# Patient Record
Sex: MALE | Race: BLACK OR AFRICAN AMERICAN | NOT HISPANIC OR LATINO
[De-identification: names, ages, dates, MRNs, and addresses within clinical notes are randomized per-mention and may not be internally consistent; named-entity substitution may affect disease eponyms.]

---

## 2017-06-19 ENCOUNTER — APPOINTMENT (OUTPATIENT)
Dept: SPINE | Facility: CLINIC | Age: 67
End: 2017-06-19
Payer: MEDICARE

## 2017-06-19 VITALS
HEART RATE: 65 BPM | BODY MASS INDEX: 24.59 KG/M2 | SYSTOLIC BLOOD PRESSURE: 110 MMHG | HEIGHT: 64 IN | DIASTOLIC BLOOD PRESSURE: 68 MMHG | WEIGHT: 144 LBS

## 2017-06-19 DIAGNOSIS — M48.02 SPINAL STENOSIS, CERVICAL REGION: ICD-10-CM

## 2017-06-19 PROCEDURE — 99213 OFFICE O/P EST LOW 20 MIN: CPT

## 2018-07-26 ENCOUNTER — APPOINTMENT (OUTPATIENT)
Dept: OPHTHALMOLOGY | Facility: CLINIC | Age: 68
End: 2018-07-26
Payer: MEDICARE

## 2018-07-26 PROCEDURE — 92225: CPT | Mod: RT

## 2018-07-26 PROCEDURE — 92134 CPTRZ OPH DX IMG PST SGM RTA: CPT

## 2018-07-26 PROCEDURE — 92004 COMPRE OPH EXAM NEW PT 1/>: CPT

## 2019-06-17 ENCOUNTER — APPOINTMENT (OUTPATIENT)
Dept: INTERNAL MEDICINE | Facility: CLINIC | Age: 69
End: 2019-06-17

## 2019-06-26 ENCOUNTER — APPOINTMENT (OUTPATIENT)
Dept: ENDOCRINOLOGY | Facility: CLINIC | Age: 69
End: 2019-06-26
Payer: MEDICARE

## 2019-06-26 VITALS
HEART RATE: 78 BPM | HEIGHT: 64 IN | BODY MASS INDEX: 24.46 KG/M2 | TEMPERATURE: 98 F | WEIGHT: 143.3 LBS | SYSTOLIC BLOOD PRESSURE: 140 MMHG | OXYGEN SATURATION: 98 % | DIASTOLIC BLOOD PRESSURE: 60 MMHG

## 2019-06-26 DIAGNOSIS — Z78.9 OTHER SPECIFIED HEALTH STATUS: ICD-10-CM

## 2019-06-26 DIAGNOSIS — Z86.39 PERSONAL HISTORY OF OTHER ENDOCRINE, NUTRITIONAL AND METABOLIC DISEASE: ICD-10-CM

## 2019-06-26 PROCEDURE — ZZZZZ: CPT

## 2019-06-26 PROCEDURE — 99204 OFFICE O/P NEW MOD 45 MIN: CPT

## 2019-06-27 PROBLEM — Z78.9 SOCIAL ALCOHOL USE: Status: ACTIVE | Noted: 2019-06-26

## 2019-06-27 PROBLEM — Z86.39 HISTORY OF DIABETES MELLITUS: Status: RESOLVED | Noted: 2019-06-26 | Resolved: 2019-06-27

## 2019-06-27 NOTE — HISTORY OF PRESENT ILLNESS
[FreeTextEntry1] : CC: Diabetes \par This is a 69 year old male with type 2 diabetes mellitus, here for consultation. \par T2DM was diagnosed >10 years ago. He was initially started on metformin. This was then changed to Janumet. He has not taken Janumet since 11/2018 when he went to [facility]. He does not SMBG at home. Recent Hba1c was found to be 10.2%\par He denies retinopathy, neuropathy, nephropathy, however he has not had a recent dilated eye exam.

## 2019-06-27 NOTE — PHYSICAL EXAM
[Alert] : alert [No Acute Distress] : no acute distress [Well Developed] : well developed [Well Nourished] : well nourished [Healthy Appearance] : healthy appearance [Normal Voice/Communication] : normal voice communication [Normal Sclera/Conjunctiva] : normal sclera/conjunctiva [No Proptosis] : no proptosis [No Neck Mass] : no neck mass was observed [Normal Oropharynx] : the oropharynx was normal [Supple] : the neck was supple [Thyroid Not Enlarged] : the thyroid was not enlarged [No LAD] : no lymphadenopathy [No Thyroid Nodules] : there were no palpable thyroid nodules [No Respiratory Distress] : no respiratory distress [Normal Rate and Effort] : normal respiratory rhythm and effort [Clear to Auscultation] : lungs were clear to auscultation bilaterally [No Accessory Muscle Use] : no accessory muscle use [Normal Rate] : heart rate was normal  [Regular Rhythm] : with a regular rhythm [Normal S1, S2] : normal S1 and S2 [Not Tender] : non-tender [No Edema] : there was no peripheral edema [Pedal Pulses Normal] : the pedal pulses are present [Spine Straight] : spine straight [Not Distended] : not distended [Soft] : abdomen soft [No Stigmata of Cushings Syndrome] : no stigmata of cushings syndrome [Normal Gait] : normal gait [Normal Strength/Tone] : muscle strength and tone were normal [Acanthosis Nigricans] : no acanthosis nigricans [Right Foot Was Examined] : right foot ~C was examined [No Involuntary Movements] : no involuntary movements were seen [Left Foot Was Examined] : left foot ~C was examined [Diminished Throughout Both Feet] : normal tactile sensation with monofilament testing throughout both feet [No Tremors] : no tremors [Normal Sensation on Monofilament Testing] : normal sensation on monofilament testing of lower extremities [Normal Insight/Judgement] : insight and judgment were intact [Normal Affect] : the affect was normal [Normal Mood] : the mood was normal [de-identified] : onychomycosis

## 2019-06-27 NOTE — ASSESSMENT
[FreeTextEntry1] : This is a 69-year-old male with uncontrolled type 2 diabetes mellitus, hyperlipidemia,hypertension, here for consultation.\par 1. Type  diabetes mellitus\par As his hemoglobin A1c is 10.8% and he has symptoms of hyperglycemia, will initiate basal/bolus insulin.\par Appointment with CDE for further education.\par Signs, symptoms, treatment of hypoglycemia reviewed.\par Start self-monitoring blood glucose 4 times a day and send logs or office for adjustments in insulin.\par He is interested in [employer] personal CGM and will see if it is covered.\par He was referred to an ophthalmologist for a dilated eye exam to evaluate for retinopathy.\par Resume metformin 500 mg 2 times a day. Increased to 1000 mg twice a day in one week if there are no GI side effects.\par He was referred to podiatry for onychomycosis.\par Check urine microalbumin.\par 2. Hypertension\par Will likely need antihypertensive medication. Check BP with next visit this week and will consider initiating ACE inhibitor at that time.\par 3. Hyperlipidemia \par He is unsure if he has been on a medication for hyperlipidemia in the past.\par Will start statin in the next few weeks after medications for diabetes and hypertension have been started to assess for possible side effects.\par \par  [Hypoglycemia Management] : hypoglycemia management [Diabetes Foot Care] : diabetes foot care [Long Term Vascular Complications] : long term vascular complications of diabetes [Action and use of Insulin] : action and use of short and long-acting insulin [Self Monitoring of Blood Glucose] : self monitoring of blood glucose [Injection Technique, Storage, Sharps Disposal] : injection technique, storage, and sharps disposal [Insulin Self-Administration] : insulin self-administration [Retinopathy Screening] : Patient was referred to ophthalmology for retinopathy screening

## 2019-06-27 NOTE — REVIEW OF SYSTEMS
[Polyuria] : polyuria [All other systems negative] : All other systems negative [FreeTextEntry9] : shoulder pain

## 2019-06-27 NOTE — CONSULT LETTER
[Dear  ___] : Dear  [unfilled], [Please see my note below.] : Please see my note below. [Consult Letter:] : I had the pleasure of evaluating your patient, [unfilled]. [Consult Closing:] : Thank you very much for allowing me to participate in the care of this patient.  If you have any questions, please do not hesitate to contact me. [Sincerely,] : Sincerely, [FreeTextEntry3] : Neil Moran M.D., [id number].\par

## 2019-06-28 ENCOUNTER — APPOINTMENT (OUTPATIENT)
Dept: ENDOCRINOLOGY | Facility: CLINIC | Age: 69
End: 2019-06-28
Payer: COMMERCIAL

## 2019-06-28 PROCEDURE — 99215 OFFICE O/P EST HI 40 MIN: CPT

## 2019-07-01 ENCOUNTER — RX RENEWAL (OUTPATIENT)
Age: 69
End: 2019-07-01

## 2019-07-01 RX ORDER — BLOOD SUGAR DIAGNOSTIC
STRIP MISCELLANEOUS 4 TIMES DAILY
Qty: 400 | Refills: 2 | Status: ACTIVE | COMMUNITY
Start: 2019-06-28 | End: 1900-01-01

## 2019-07-01 RX ORDER — LANCETS
EACH MISCELLANEOUS
Qty: 400 | Refills: 2 | Status: ACTIVE | COMMUNITY
Start: 2019-06-28 | End: 1900-01-01

## 2019-07-17 ENCOUNTER — APPOINTMENT (OUTPATIENT)
Dept: ENDOCRINOLOGY | Facility: CLINIC | Age: 69
End: 2019-07-17
Payer: COMMERCIAL

## 2019-07-17 PROCEDURE — 99215 OFFICE O/P EST HI 40 MIN: CPT

## 2019-08-07 ENCOUNTER — APPOINTMENT (OUTPATIENT)
Dept: ENDOCRINOLOGY | Facility: CLINIC | Age: 69
End: 2019-08-07
Payer: COMMERCIAL

## 2019-08-07 PROCEDURE — 99215 OFFICE O/P EST HI 40 MIN: CPT

## 2019-08-12 ENCOUNTER — MEDICATION RENEWAL (OUTPATIENT)
Age: 69
End: 2019-08-12

## 2019-08-12 RX ORDER — ISOPROPYL ALCOHOL 0.7 ML/ML
SWAB TOPICAL
Qty: 400 | Refills: 2 | Status: ACTIVE | COMMUNITY
Start: 2019-08-12 | End: 1900-01-01

## 2019-08-28 ENCOUNTER — APPOINTMENT (OUTPATIENT)
Dept: ENDOCRINOLOGY | Facility: CLINIC | Age: 69
End: 2019-08-28
Payer: COMMERCIAL

## 2019-08-28 PROCEDURE — 99215 OFFICE O/P EST HI 40 MIN: CPT

## 2019-10-02 ENCOUNTER — APPOINTMENT (OUTPATIENT)
Dept: ENDOCRINOLOGY | Facility: CLINIC | Age: 69
End: 2019-10-02
Payer: MEDICARE

## 2019-10-02 PROCEDURE — 99215 OFFICE O/P EST HI 40 MIN: CPT

## 2019-10-10 ENCOUNTER — EMERGENCY (EMERGENCY)
Facility: HOSPITAL | Age: 69
LOS: 1 days | Discharge: HOME | End: 2019-10-10
Attending: EMERGENCY MEDICINE | Admitting: EMERGENCY MEDICINE
Payer: MEDICARE

## 2019-10-10 VITALS
TEMPERATURE: 98 F | RESPIRATION RATE: 18 BRPM | HEART RATE: 71 BPM | OXYGEN SATURATION: 100 % | DIASTOLIC BLOOD PRESSURE: 78 MMHG | SYSTOLIC BLOOD PRESSURE: 135 MMHG

## 2019-10-10 VITALS
DIASTOLIC BLOOD PRESSURE: 83 MMHG | OXYGEN SATURATION: 99 % | RESPIRATION RATE: 16 BRPM | SYSTOLIC BLOOD PRESSURE: 141 MMHG | HEART RATE: 70 BPM | TEMPERATURE: 98 F

## 2019-10-10 DIAGNOSIS — Z87.19 PERSONAL HISTORY OF OTHER DISEASES OF THE DIGESTIVE SYSTEM: Chronic | ICD-10-CM

## 2019-10-10 LAB
ALBUMIN SERPL ELPH-MCNC: 4.1 G/DL — SIGNIFICANT CHANGE UP (ref 3.3–5)
ALP SERPL-CCNC: 50 U/L — SIGNIFICANT CHANGE UP (ref 40–120)
ALT FLD-CCNC: 18 U/L — SIGNIFICANT CHANGE UP (ref 4–41)
ANION GAP SERPL CALC-SCNC: 11 MMO/L — SIGNIFICANT CHANGE UP (ref 7–14)
AST SERPL-CCNC: 33 U/L — SIGNIFICANT CHANGE UP (ref 4–40)
BASOPHILS # BLD AUTO: 0.02 K/UL — SIGNIFICANT CHANGE UP (ref 0–0.2)
BASOPHILS NFR BLD AUTO: 0.4 % — SIGNIFICANT CHANGE UP (ref 0–2)
BILIRUB SERPL-MCNC: 0.7 MG/DL — SIGNIFICANT CHANGE UP (ref 0.2–1.2)
BUN SERPL-MCNC: 12 MG/DL — SIGNIFICANT CHANGE UP (ref 7–23)
CALCIUM SERPL-MCNC: 9.5 MG/DL — SIGNIFICANT CHANGE UP (ref 8.4–10.5)
CHLORIDE SERPL-SCNC: 100 MMOL/L — SIGNIFICANT CHANGE UP (ref 98–107)
CO2 SERPL-SCNC: 27 MMOL/L — SIGNIFICANT CHANGE UP (ref 22–31)
CREAT SERPL-MCNC: 0.85 MG/DL — SIGNIFICANT CHANGE UP (ref 0.5–1.3)
EOSINOPHIL # BLD AUTO: 0.12 K/UL — SIGNIFICANT CHANGE UP (ref 0–0.5)
EOSINOPHIL NFR BLD AUTO: 2.4 % — SIGNIFICANT CHANGE UP (ref 0–6)
GLUCOSE SERPL-MCNC: 262 MG/DL — HIGH (ref 70–99)
HCT VFR BLD CALC: 42.9 % — SIGNIFICANT CHANGE UP (ref 39–50)
HGB BLD-MCNC: 14.4 G/DL — SIGNIFICANT CHANGE UP (ref 13–17)
IMM GRANULOCYTES NFR BLD AUTO: 0.2 % — SIGNIFICANT CHANGE UP (ref 0–1.5)
LIDOCAIN IGE QN: 27.3 U/L — SIGNIFICANT CHANGE UP (ref 7–60)
LYMPHOCYTES # BLD AUTO: 1.71 K/UL — SIGNIFICANT CHANGE UP (ref 1–3.3)
LYMPHOCYTES # BLD AUTO: 34.5 % — SIGNIFICANT CHANGE UP (ref 13–44)
MCHC RBC-ENTMCNC: 28.7 PG — SIGNIFICANT CHANGE UP (ref 27–34)
MCHC RBC-ENTMCNC: 33.6 % — SIGNIFICANT CHANGE UP (ref 32–36)
MCV RBC AUTO: 85.5 FL — SIGNIFICANT CHANGE UP (ref 80–100)
MONOCYTES # BLD AUTO: 0.61 K/UL — SIGNIFICANT CHANGE UP (ref 0–0.9)
MONOCYTES NFR BLD AUTO: 12.3 % — SIGNIFICANT CHANGE UP (ref 2–14)
NEUTROPHILS # BLD AUTO: 2.48 K/UL — SIGNIFICANT CHANGE UP (ref 1.8–7.4)
NEUTROPHILS NFR BLD AUTO: 50.2 % — SIGNIFICANT CHANGE UP (ref 43–77)
NRBC # FLD: 0 K/UL — SIGNIFICANT CHANGE UP (ref 0–0)
PLATELET # BLD AUTO: 152 K/UL — SIGNIFICANT CHANGE UP (ref 150–400)
PMV BLD: 9.7 FL — SIGNIFICANT CHANGE UP (ref 7–13)
POTASSIUM SERPL-MCNC: 4.3 MMOL/L — SIGNIFICANT CHANGE UP (ref 3.5–5.3)
POTASSIUM SERPL-SCNC: 4.3 MMOL/L — SIGNIFICANT CHANGE UP (ref 3.5–5.3)
PROT SERPL-MCNC: 7.4 G/DL — SIGNIFICANT CHANGE UP (ref 6–8.3)
RBC # BLD: 5.02 M/UL — SIGNIFICANT CHANGE UP (ref 4.2–5.8)
RBC # FLD: 13 % — SIGNIFICANT CHANGE UP (ref 10.3–14.5)
SODIUM SERPL-SCNC: 138 MMOL/L — SIGNIFICANT CHANGE UP (ref 135–145)
TROPONIN T, HIGH SENSITIVITY: 15 NG/L — SIGNIFICANT CHANGE UP (ref ?–14)
TROPONIN T, HIGH SENSITIVITY: 15 NG/L — SIGNIFICANT CHANGE UP (ref ?–14)
WBC # BLD: 4.95 K/UL — SIGNIFICANT CHANGE UP (ref 3.8–10.5)
WBC # FLD AUTO: 4.95 K/UL — SIGNIFICANT CHANGE UP (ref 3.8–10.5)

## 2019-10-10 PROCEDURE — 76705 ECHO EXAM OF ABDOMEN: CPT | Mod: 26

## 2019-10-10 PROCEDURE — 71046 X-RAY EXAM CHEST 2 VIEWS: CPT | Mod: 26,GW

## 2019-10-10 PROCEDURE — 99284 EMERGENCY DEPT VISIT MOD MDM: CPT

## 2019-10-10 RX ORDER — FAMOTIDINE 10 MG/ML
20 INJECTION INTRAVENOUS ONCE
Refills: 0 | Status: COMPLETED | OUTPATIENT
Start: 2019-10-10 | End: 2019-10-10

## 2019-10-10 RX ADMIN — FAMOTIDINE 20 MILLIGRAM(S): 10 INJECTION INTRAVENOUS at 14:45

## 2019-10-10 RX ADMIN — Medication 30 MILLILITER(S): at 14:45

## 2019-10-10 NOTE — ED PROVIDER NOTE - PATIENT PORTAL LINK FT
You can access the FollowMyHealth Patient Portal offered by [facility] by registering at the following website: [email or web address]. By joining [facility]’s FollowMyHealth portal, you will also be able to view your health information using other applications (apps) compatible with our system.

## 2019-10-10 NOTE — ED PROVIDER NOTE - NSFOLLOWUPINSTRUCTIONS_ED_ALL_ED_FT
Please followup with your primary care doctor within one week. You came to the hospital with chest pain. Cardiac causes of your chest pain were excluded and your pain improved with anti-acid medication.

## 2019-10-10 NOTE — ED PROVIDER NOTE - PMH
BPH (benign prostatic hyperplasia)    HLD (hyperlipidemia)    HTN (hypertension)    T2DM (type 2 diabetes mellitus)

## 2019-10-10 NOTE — ED ADULT NURSE NOTE - NSIMPLEMENTINTERV_GEN_ALL_ED
Implemented All Universal Safety Interventions:  [place] to call system. Call bell, personal items and telephone within reach. Instruct patient to call for assistance. Room bathroom lighting operational. Non-slip footwear when patient is off stretcher. Physically safe environment: no spills, clutter or unnecessary equipment. Stretcher in lowest position, wheels locked, appropriate side rails in place.

## 2019-10-10 NOTE — ED PROVIDER NOTE - OBJECTIVE STATEMENT
Pt is a 70y/o M with PMHx of HTN, HLD, poorly controlled T2DM, BPH and spinal stenosis presenting to the ED from urology office with CP.  Pt reports pain located in chest/epigastric that started last night. radiates "up." Constant, nonexertional, improves with drinking water. Also has had stabbing abd pain since yesterday that improves with lying flat. Denies cardiac history, reports prior hiatal hernia surgery. No palpitations, HA, N/V, fever/chills, diarrhea, leg swelling or SOB. +Dysuria, frequency and constipation. However urinary Sx have been present for months. Recently had ralph removed by urology.

## 2019-10-10 NOTE — ED ADULT TRIAGE NOTE - CHIEF COMPLAINT QUOTE
pt brought from Neurology office c/o CP since yesterday, non-radiating, pt states "feels like something is coming up," ASA 162mg by EMS en route, denies cardiac history,  by EMS, denies SOB, diaphoresis, N/V/D, presents w/ 18g R AC by EMS

## 2019-10-10 NOTE — ED PROVIDER NOTE - ATTENDING CONTRIBUTION TO CARE
69M pmhx T2DM and prior hiatal hernia repair p/w epigastric pain radiating up into chest. ECG non-ischemic. Likely GERD/dyspepsia. Will check CMP/Trop/Lipase, RUQ U/S and give GI cocktail.   GEN - NAD; well appearing; A+O x3   HEAD - NC/AT     EYES - EOMI, no conjunctival pallor, no scleral icterus  ENT -   mucous membranes  moist , no discharge      NECK - Neck supple  PULM - CTA b/l,  symmetric breath sounds  COR -  RRR, S1 S2, no murmurs  ABD - TTP epigastric   BACK - no CVA tenderness, nontender spine     EXTREMS - no edema, no deformity, warm and well perfused    SKIN - no rash or bruising      NEUROLOGIC - alert, sensation nl, motor 5/5 RUE/LUE/RLE/LLE

## 2019-10-10 NOTE — ED PROVIDER NOTE - PROGRESS NOTE DETAILS
Pt reassessed: reporting some improvement of pain with Maalox. Troponin stable 15->15. RUQ U/S and CXR unremarkable. Will discharge patient.

## 2019-10-10 NOTE — ED PROVIDER NOTE - CLINICAL SUMMARY MEDICAL DECISION MAKING FREE TEXT BOX
69M Hx of poorly controlled T2DM and prior hiatal hernia repair p/w epigastric pain radiating up into chest. ECG non-ischemic. Likely GERD/dyspepsia. Will check CMP/Trop/Lipase, RUQ U/S and give Pepcid/Maalox then reassess

## 2019-10-10 NOTE — ED ADULT NURSE NOTE - OBJECTIVE STATEMENT
Pt a&xo3 c/o mid-sternal chest pain, non-radiating since yesterday, states he feels like the pain is coming up, no headache/dizziness, no sob, abd soft, non-tender, non-distended, no n/v/d, pt coming from neurologist office, has 18g iv to Rt ac placed by ems, labs drawn and sent, md at bedside, will continue to monitor.

## 2019-10-23 ENCOUNTER — MEDICATION RENEWAL (OUTPATIENT)
Age: 69
End: 2019-10-23

## 2019-10-23 ENCOUNTER — APPOINTMENT (OUTPATIENT)
Dept: ENDOCRINOLOGY | Facility: CLINIC | Age: 69
End: 2019-10-23
Payer: MEDICARE

## 2019-10-23 PROBLEM — E11.9 TYPE 2 DIABETES MELLITUS WITHOUT COMPLICATIONS: Chronic | Status: ACTIVE | Noted: 2019-10-10

## 2019-10-23 PROBLEM — E78.5 HYPERLIPIDEMIA, UNSPECIFIED: Chronic | Status: ACTIVE | Noted: 2019-10-10

## 2019-10-23 PROBLEM — N40.0 BENIGN PROSTATIC HYPERPLASIA WITHOUT LOWER URINARY TRACT SYMPTOMS: Chronic | Status: ACTIVE | Noted: 2019-10-10

## 2019-10-23 PROBLEM — I10 ESSENTIAL (PRIMARY) HYPERTENSION: Chronic | Status: ACTIVE | Noted: 2019-10-10

## 2019-10-23 PROCEDURE — 99215 OFFICE O/P EST HI 40 MIN: CPT

## 2019-10-23 RX ORDER — INSULIN LISPRO 100 [IU]/ML
100 INJECTION, SOLUTION INTRAVENOUS; SUBCUTANEOUS
Qty: 5 | Refills: 1 | Status: COMPLETED | COMMUNITY
Start: 2019-06-28 | End: 2019-10-23

## 2019-11-04 ENCOUNTER — APPOINTMENT (OUTPATIENT)
Dept: ENDOCRINOLOGY | Facility: CLINIC | Age: 69
End: 2019-11-04

## 2019-11-06 ENCOUNTER — APPOINTMENT (OUTPATIENT)
Dept: ENDOCRINOLOGY | Facility: CLINIC | Age: 69
End: 2019-11-06

## 2019-11-20 ENCOUNTER — RX RENEWAL (OUTPATIENT)
Age: 69
End: 2019-11-20

## 2020-07-30 ENCOUNTER — APPOINTMENT (OUTPATIENT)
Dept: ENDOCRINOLOGY | Facility: CLINIC | Age: 70
End: 2020-07-30
Payer: MEDICARE

## 2020-07-30 VITALS
OXYGEN SATURATION: 98 % | HEART RATE: 88 BPM | DIASTOLIC BLOOD PRESSURE: 83 MMHG | SYSTOLIC BLOOD PRESSURE: 148 MMHG | TEMPERATURE: 98.2 F

## 2020-07-30 PROCEDURE — 99214 OFFICE O/P EST MOD 30 MIN: CPT | Mod: 25

## 2020-07-30 PROCEDURE — 36415 COLL VENOUS BLD VENIPUNCTURE: CPT

## 2020-07-30 RX ORDER — BLOOD-GLUCOSE METER
KIT MISCELLANEOUS
Qty: 1 | Refills: 0 | Status: ACTIVE | COMMUNITY
Start: 2020-07-30 | End: 1900-01-01

## 2020-07-30 NOTE — PHYSICAL EXAM
[Alert] : alert [Well Nourished] : well nourished [Healthy Appearance] : healthy appearance [No Acute Distress] : no acute distress [Well Developed] : well developed [Normal Voice/Communication] : normal voice communication [Normal Sclera/Conjunctiva] : normal sclera/conjunctiva [No Proptosis] : no proptosis [No Neck Mass] : no neck mass was observed [No LAD] : no lymphadenopathy [Supple] : the neck was supple [Thyroid Not Enlarged] : the thyroid was not enlarged [No Thyroid Nodules] : no palpable thyroid nodules [No Respiratory Distress] : no respiratory distress [No Accessory Muscle Use] : no accessory muscle use [Normal Rate and Effort] : normal respiratory rate and effort [Normal Rate] : heart rate was normal [No Edema] : no peripheral edema [Pedal Pulses Normal] : the pedal pulses are present [Not Distended] : not distended [No Stigmata of Cushings Syndrome] : no stigmata of Cushings Syndrome [Spine Straight] : spine straight [Normal Gait] : normal gait [No Clubbing, Cyanosis] : no clubbing  or cyanosis of the fingernails [Acanthosis Nigricans] : no acanthosis nigricans [No Involuntary Movements] : no involuntary movements were seen [Right Foot Was Examined] : right foot ~C was examined [Left Foot Was Examined] : left foot ~C was examined [Normal] : normal [2+] : 2+ in the dorsalis pedis [Diminished Throughout Both Feet] : normal tactile sensation with monofilament testing throughout both feet [No Tremors] : no tremors [Normal Affect] : the affect was normal [Normal Sensation on Monofilament Testing] : normal sensation on monofilament testing of lower extremities [Normal Insight/Judgement] : insight and judgment were intact [Normal Mood] : the mood was normal

## 2020-07-30 NOTE — HISTORY OF PRESENT ILLNESS
[FreeTextEntry1] : CC: Diabetes \par This is a 70 year old male with type 2 diabetes mellitus, here for consultation. \par T2DM was diagnosed >10 years ago. He was initially started on metformin. This was then changed to Janumet. He is now on Novolog 4 units with breakfast. He is not taking Novolog at lunch or dinner. He takes Tresiba 8-10 units qhs. He SMBG 1 x day. F 130-180s. He denies hypoglycemia. \par He has not seen an ophthalmologist in over 5 years. He denies retinopathy, neuropathy, and nephropathy.\par He is not on a statin.

## 2020-07-30 NOTE — ASSESSMENT
[FreeTextEntry1] : This is a 8-year-old male with uncontrolled type 2 diabetes mellitus, hyperlipidemia,hypertension, here for consultation.\par 1. Type  diabetes mellitus\par Check hemoglobin A1c.\par He was advised to take NovoLog 4 units with each meal.\par He was advised to start self monitoring blood glucose 4 times a day. \par Appointment with CDE. Start Novolog correction scale qac. \par He was referred to an ophthalmologist for a dilated eye exam to evaluate for retinopathy as he has not had a dilated eye exam in 5 years. \par Check urine microalbumin to evaluate for nephropathy. \par He denies neuropathy. \par He is not on a statin. Check lipid panel. \par 2. Hypertension\par Will likely need antihypertensive medication. Check BP at home and call with readings. Will consider initiating ACE inhibitor.\par 3. Hyperlipidemia \par Check lipid panel. \par Will likely needs statin after bloodwork is reviewed. \par \par

## 2020-07-31 LAB
ALBUMIN SERPL ELPH-MCNC: 4.4 G/DL
ALP BLD-CCNC: 63 U/L
ALT SERPL-CCNC: 16 U/L
ANION GAP SERPL CALC-SCNC: 11 MMOL/L
AST SERPL-CCNC: 22 U/L
BASOPHILS # BLD AUTO: 0.02 K/UL
BASOPHILS NFR BLD AUTO: 0.4 %
BILIRUB SERPL-MCNC: 0.6 MG/DL
BUN SERPL-MCNC: 14 MG/DL
CALCIUM SERPL-MCNC: 9.8 MG/DL
CHLORIDE SERPL-SCNC: 103 MMOL/L
CHOLEST SERPL-MCNC: 181 MG/DL
CHOLEST/HDLC SERPL: 3 RATIO
CO2 SERPL-SCNC: 29 MMOL/L
CREAT SERPL-MCNC: 0.74 MG/DL
EOSINOPHIL # BLD AUTO: 0.08 K/UL
EOSINOPHIL NFR BLD AUTO: 1.5 %
ESTIMATED AVERAGE GLUCOSE: 163 MG/DL
GLUCOSE SERPL-MCNC: 144 MG/DL
HBA1C MFR BLD HPLC: 7.3 %
HCT VFR BLD CALC: 42.4 %
HDLC SERPL-MCNC: 59 MG/DL
HGB BLD-MCNC: 13.2 G/DL
IMM GRANULOCYTES NFR BLD AUTO: 0.2 %
LDLC SERPL CALC-MCNC: 99 MG/DL
LYMPHOCYTES # BLD AUTO: 1.22 K/UL
LYMPHOCYTES NFR BLD AUTO: 22.3 %
MAN DIFF?: NORMAL
MCHC RBC-ENTMCNC: 28.6 PG
MCHC RBC-ENTMCNC: 31.1 GM/DL
MCV RBC AUTO: 92 FL
MONOCYTES # BLD AUTO: 0.5 K/UL
MONOCYTES NFR BLD AUTO: 9.1 %
NEUTROPHILS # BLD AUTO: 3.65 K/UL
NEUTROPHILS NFR BLD AUTO: 66.5 %
PLATELET # BLD AUTO: 189 K/UL
POTASSIUM SERPL-SCNC: 4.2 MMOL/L
PROT SERPL-MCNC: 7.3 G/DL
RBC # BLD: 4.61 M/UL
RBC # FLD: 15 %
SODIUM SERPL-SCNC: 143 MMOL/L
TRIGL SERPL-MCNC: 111 MG/DL
WBC # FLD AUTO: 5.48 K/UL

## 2020-08-06 ENCOUNTER — APPOINTMENT (OUTPATIENT)
Dept: ENDOCRINOLOGY | Facility: CLINIC | Age: 70
End: 2020-08-06
Payer: MEDICARE

## 2020-08-06 PROCEDURE — G0108 DIAB MANAGE TRN  PER INDIV: CPT

## 2020-09-17 ENCOUNTER — APPOINTMENT (OUTPATIENT)
Dept: ENDOCRINOLOGY | Facility: CLINIC | Age: 70
End: 2020-09-17

## 2020-11-11 NOTE — ED PROVIDER NOTE - CPE EDP RESP NORM
[facility] Call Center    Phone Message    May a detailed message be left on voicemail: no     Reason for Call: Other: Evelin would like a call back to report an urgent finding     Action Taken: Message routed to:  [facility] ([facility]): Cardio    Travel Screening: Not Applicable                                                                       normal...

## 2020-12-10 ENCOUNTER — APPOINTMENT (OUTPATIENT)
Dept: ENDOCRINOLOGY | Facility: CLINIC | Age: 70
End: 2020-12-10
Payer: MEDICARE

## 2020-12-10 VITALS
HEART RATE: 81 BPM | DIASTOLIC BLOOD PRESSURE: 96 MMHG | HEIGHT: 62.2 IN | OXYGEN SATURATION: 99 % | SYSTOLIC BLOOD PRESSURE: 169 MMHG | TEMPERATURE: 97.1 F | WEIGHT: 150.21 LBS | BODY MASS INDEX: 27.29 KG/M2

## 2020-12-10 DIAGNOSIS — E16.2 HYPOGLYCEMIA, UNSPECIFIED: ICD-10-CM

## 2020-12-10 PROCEDURE — 36415 COLL VENOUS BLD VENIPUNCTURE: CPT

## 2020-12-10 PROCEDURE — 99072 ADDL SUPL MATRL&STAF TM PHE: CPT

## 2020-12-10 PROCEDURE — G0108 DIAB MANAGE TRN  PER INDIV: CPT

## 2020-12-10 PROCEDURE — 99214 OFFICE O/P EST MOD 30 MIN: CPT | Mod: 25

## 2020-12-10 RX ORDER — RAMIPRIL 2.5 MG/1
2.5 CAPSULE ORAL DAILY
Qty: 90 | Refills: 2 | Status: COMPLETED | COMMUNITY
Start: 2020-12-10 | End: 2021-09-06

## 2020-12-11 LAB
ALBUMIN SERPL ELPH-MCNC: 4.7 G/DL
ALP BLD-CCNC: 66 U/L
ALT SERPL-CCNC: 25 U/L
ANION GAP SERPL CALC-SCNC: 14 MMOL/L
AST SERPL-CCNC: 32 U/L
BASOPHILS # BLD AUTO: 0.03 K/UL
BASOPHILS NFR BLD AUTO: 0.4 %
BILIRUB SERPL-MCNC: 0.6 MG/DL
BUN SERPL-MCNC: 14 MG/DL
CALCIUM SERPL-MCNC: 9.7 MG/DL
CHLORIDE SERPL-SCNC: 104 MMOL/L
CHOLEST SERPL-MCNC: 183 MG/DL
CO2 SERPL-SCNC: 27 MMOL/L
CREAT SERPL-MCNC: 1.02 MG/DL
CREAT SPEC-SCNC: 69 MG/DL
EOSINOPHIL # BLD AUTO: 0.16 K/UL
EOSINOPHIL NFR BLD AUTO: 2 %
ESTIMATED AVERAGE GLUCOSE: 151 MG/DL
GLUCOSE BLDC GLUCOMTR-MCNC: 121
GLUCOSE BLDC GLUCOMTR-MCNC: 70
GLUCOSE SERPL-MCNC: 70 MG/DL
HBA1C MFR BLD HPLC: 6.9 %
HCT VFR BLD CALC: 46.6 %
HDLC SERPL-MCNC: 67 MG/DL
HGB BLD-MCNC: 15.1 G/DL
IMM GRANULOCYTES NFR BLD AUTO: 0.1 %
LDLC SERPL CALC-MCNC: 105 MG/DL
LYMPHOCYTES # BLD AUTO: 4 K/UL
LYMPHOCYTES NFR BLD AUTO: 50.9 %
MAN DIFF?: NORMAL
MCHC RBC-ENTMCNC: 28.4 PG
MCHC RBC-ENTMCNC: 32.4 GM/DL
MCV RBC AUTO: 87.6 FL
MICROALBUMIN 24H UR DL<=1MG/L-MCNC: 5.1 MG/DL
MICROALBUMIN/CREAT 24H UR-RTO: 74 MG/G
MONOCYTES # BLD AUTO: 0.69 K/UL
MONOCYTES NFR BLD AUTO: 8.8 %
NEUTROPHILS # BLD AUTO: 2.97 K/UL
NEUTROPHILS NFR BLD AUTO: 37.8 %
NONHDLC SERPL-MCNC: 117 MG/DL
PLATELET # BLD AUTO: 189 K/UL
POTASSIUM SERPL-SCNC: 3.7 MMOL/L
PROT SERPL-MCNC: 7.5 G/DL
RBC # BLD: 5.32 M/UL
RBC # FLD: 14.1 %
SODIUM SERPL-SCNC: 145 MMOL/L
TRIGL SERPL-MCNC: 59 MG/DL
WBC # FLD AUTO: 7.86 K/UL

## 2020-12-14 PROBLEM — E16.2 HYPOGLYCEMIA: Status: ACTIVE | Noted: 2020-12-14

## 2020-12-14 NOTE — PHYSICAL EXAM
[Alert] : alert [Well Nourished] : well nourished [Healthy Appearance] : healthy appearance [No Acute Distress] : no acute distress [Well Developed] : well developed [Normal Voice/Communication] : normal voice communication [Normal Sclera/Conjunctiva] : normal sclera/conjunctiva [No Proptosis] : no proptosis [No Neck Mass] : no neck mass was observed [No LAD] : no lymphadenopathy [Supple] : the neck was supple [Thyroid Not Enlarged] : the thyroid was not enlarged [No Thyroid Nodules] : no palpable thyroid nodules [No Respiratory Distress] : no respiratory distress [No Accessory Muscle Use] : no accessory muscle use [Normal Rate and Effort] : normal respiratory rate and effort [Clear to Auscultation] : lungs were clear to auscultation bilaterally [Normal S1, S2] : normal S1 and S2 [Normal Rate] : heart rate was normal [Regular Rhythm] : with a regular rhythm [No Edema] : no peripheral edema [Pedal Pulses Normal] : the pedal pulses are present [Not Distended] : not distended [Spine Straight] : spine straight [No Stigmata of Cushings Syndrome] : no stigmata of Cushings Syndrome [Normal Gait] : normal gait [No Clubbing, Cyanosis] : no clubbing  or cyanosis of the fingernails [No Involuntary Movements] : no involuntary movements were seen [Acanthosis Nigricans] : no acanthosis nigricans [Right Foot Was Examined] : right foot ~C was examined [Left Foot Was Examined] : left foot ~C was examined [Normal] : normal [2+] : 2+ in the dorsalis pedis [Diminished Throughout Both Feet] : normal tactile sensation with monofilament testing throughout both feet [No Tremors] : no tremors [Normal Sensation on Monofilament Testing] : normal sensation on monofilament testing of lower extremities [Normal Affect] : the affect was normal [Normal Insight/Judgement] : insight and judgment were intact [Normal Mood] : the mood was normal

## 2020-12-14 NOTE — ASSESSMENT
[FreeTextEntry1] : This is a 70-year-old male with uncontrolled type 2 diabetes mellitus, hyperlipidemia,hypertension, here for follow up.\par 1. Type  diabetes mellitus\par Check hemoglobin A1c.\par He was advised to start self monitoring blood glucose 4 times a day. \par Signs, symptoms, treatment of hypoglycemia were reviewed. He was provided with juice today and repeat FS glucose is in normal range.\par Appointment with CDE.\par He was referred to an ophthalmologist for a dilated eye exam to evaluate for retinopathy as he has not had a dilated eye exam in over 5 years. \par Check urine microalbumin to evaluate for nephropathy. \par He denies neuropathy. \par He is not on a statin. Check lipid panel. \par 2. Hypertension\par Start ramipril 2.5mg qd. Potential side effects were reviewed.\par 3. Hyperlipidemia \par Check lipid panel. \par Will likely needs statin after bloodwork is reviewed. \par \par

## 2020-12-14 NOTE — HISTORY OF PRESENT ILLNESS
[FreeTextEntry1] : CC: Diabetes \par This is a 70 year old male with insulin requiring type 2 diabetes mellitus, HTN, HLD, here for follow up. \par T2DM was diagnosed >10 years ago. He was initially started on metformin. This was then changed to Janumet. He is now on Novolog 4-5 units with breakfast and dinner. He often skips lunch. He takes Basaglar 10 units qhs. He SMBG 2 x day. F 150-200s, L 160s. He denies hypoglycemia but had an episode of hypoglycemia in office today due to skipping meal. \par He has not seen an ophthalmologist in over 5 years. He denies retinopathy, neuropathy, and nephropathy.\par He is not on a statin.

## 2020-12-21 RX ORDER — BLOOD SUGAR DIAGNOSTIC
STRIP MISCELLANEOUS
Qty: 4 | Refills: 3 | Status: COMPLETED | COMMUNITY
Start: 2019-07-01 | End: 2021-12-16

## 2021-01-04 ENCOUNTER — NON-APPOINTMENT (OUTPATIENT)
Age: 71
End: 2021-01-04

## 2021-01-04 ENCOUNTER — APPOINTMENT (OUTPATIENT)
Dept: OPHTHALMOLOGY | Facility: CLINIC | Age: 71
End: 2021-01-04
Payer: MEDICARE

## 2021-01-04 PROCEDURE — 92014 COMPRE OPH EXAM EST PT 1/>: CPT

## 2021-01-04 PROCEDURE — 99072 ADDL SUPL MATRL&STAF TM PHE: CPT

## 2021-01-04 PROCEDURE — 92134 CPTRZ OPH DX IMG PST SGM RTA: CPT

## 2021-01-11 ENCOUNTER — APPOINTMENT (OUTPATIENT)
Dept: ENDOCRINOLOGY | Facility: CLINIC | Age: 71
End: 2021-01-11
Payer: MEDICARE

## 2021-01-11 PROCEDURE — 99072 ADDL SUPL MATRL&STAF TM PHE: CPT

## 2021-01-11 PROCEDURE — G0108 DIAB MANAGE TRN  PER INDIV: CPT

## 2021-02-22 ENCOUNTER — APPOINTMENT (OUTPATIENT)
Dept: ENDOCRINOLOGY | Facility: CLINIC | Age: 71
End: 2021-02-22
Payer: MEDICARE

## 2021-02-22 PROCEDURE — G0108 DIAB MANAGE TRN  PER INDIV: CPT

## 2021-02-22 PROCEDURE — 99072 ADDL SUPL MATRL&STAF TM PHE: CPT

## 2021-03-15 ENCOUNTER — APPOINTMENT (OUTPATIENT)
Dept: ENDOCRINOLOGY | Facility: CLINIC | Age: 71
End: 2021-03-15
Payer: MEDICARE

## 2021-03-15 VITALS
DIASTOLIC BLOOD PRESSURE: 77 MMHG | HEART RATE: 74 BPM | OXYGEN SATURATION: 98 % | BODY MASS INDEX: 27.2 KG/M2 | WEIGHT: 149.67 LBS | TEMPERATURE: 97.7 F | SYSTOLIC BLOOD PRESSURE: 131 MMHG | HEIGHT: 62.2 IN

## 2021-03-15 LAB — GLUCOSE BLDC GLUCOMTR-MCNC: 108

## 2021-03-15 PROCEDURE — 99214 OFFICE O/P EST MOD 30 MIN: CPT | Mod: 25

## 2021-03-15 PROCEDURE — 36415 COLL VENOUS BLD VENIPUNCTURE: CPT

## 2021-03-15 PROCEDURE — 99072 ADDL SUPL MATRL&STAF TM PHE: CPT

## 2021-03-16 LAB
ALBUMIN SERPL ELPH-MCNC: 4.5 G/DL
ALP BLD-CCNC: 54 U/L
ALT SERPL-CCNC: 20 U/L
ANION GAP SERPL CALC-SCNC: 11 MMOL/L
AST SERPL-CCNC: 35 U/L
BASOPHILS # BLD AUTO: 0.02 K/UL
BASOPHILS NFR BLD AUTO: 0.4 %
BILIRUB SERPL-MCNC: 0.7 MG/DL
BUN SERPL-MCNC: 19 MG/DL
CALCIUM SERPL-MCNC: 9.8 MG/DL
CHLORIDE SERPL-SCNC: 102 MMOL/L
CHOLEST SERPL-MCNC: 162 MG/DL
CO2 SERPL-SCNC: 28 MMOL/L
CREAT SERPL-MCNC: 0.98 MG/DL
EOSINOPHIL # BLD AUTO: 0.11 K/UL
EOSINOPHIL NFR BLD AUTO: 2.4 %
ESTIMATED AVERAGE GLUCOSE: 134 MG/DL
GLUCOSE SERPL-MCNC: 76 MG/DL
HBA1C MFR BLD HPLC: 6.3 %
HCT VFR BLD CALC: 46 %
HDLC SERPL-MCNC: 56 MG/DL
HGB BLD-MCNC: 14.5 G/DL
IMM GRANULOCYTES NFR BLD AUTO: 0 %
LDLC SERPL CALC-MCNC: 81 MG/DL
LYMPHOCYTES # BLD AUTO: 1.8 K/UL
LYMPHOCYTES NFR BLD AUTO: 38.9 %
MAN DIFF?: NORMAL
MCHC RBC-ENTMCNC: 28.6 PG
MCHC RBC-ENTMCNC: 31.5 GM/DL
MCV RBC AUTO: 90.7 FL
MONOCYTES # BLD AUTO: 0.45 K/UL
MONOCYTES NFR BLD AUTO: 9.7 %
NEUTROPHILS # BLD AUTO: 2.25 K/UL
NEUTROPHILS NFR BLD AUTO: 48.6 %
NONHDLC SERPL-MCNC: 107 MG/DL
PLATELET # BLD AUTO: 182 K/UL
POTASSIUM SERPL-SCNC: 4.2 MMOL/L
PROT SERPL-MCNC: 7.2 G/DL
RBC # BLD: 5.07 M/UL
RBC # FLD: 14.2 %
SODIUM SERPL-SCNC: 141 MMOL/L
TRIGL SERPL-MCNC: 127 MG/DL
WBC # FLD AUTO: 4.63 K/UL

## 2021-03-19 NOTE — HISTORY OF PRESENT ILLNESS
[FreeTextEntry1] : CC: Diabetes \par This is a 71 year old male with insulin requiring type 2 diabetes mellitus, HTN, HLD, here for follow up. \par T2DM was diagnosed >10 years ago. He was initially started on metformin. This was then changed to Janumet. He is now on Novolog before meals and Basaglar 8 units at bedtime.\par He self monitors blood glucose 6 times per day.  Fasting 70-120s, before meals low 100s, after meals 130-160s.  He reports rare hypoglycemia.\par He saw his ophthalmologist in December 2020 and denies retinopathy.\par He denies neuropathy.  He denies nephropathy.\par He is on pravastatin 10 mg daily.

## 2021-03-19 NOTE — ASSESSMENT
[FreeTextEntry1] : This is a 71-year-old male with uncontrolled type 2 diabetes mellitus, hyperlipidemia,hypertension, here for follow up.\par 1. Type  diabetes mellitus\par Check hemoglobin A1c.\par Decrease Basaglar from 8 units to 7 units to prevent fasting hypoglycemia.\par Signs, symptoms, treatment of hypoglycemia were reviewed.\par He self monitors blood glucose 6 times a day and takes for insulin injections daily. \par He is interested in CGM.  Will see if it is covered.\par Appointment with CDE.\par He saw his ophthalmologist in December 2020 and denies retinopathy.\par Check urine microalbumin to evaluate for nephropathy. \par He denies neuropathy. \par He is on pravastatin 10 mg daily.\par Will consider discontinuing prandial insulin and starting oral medications pending results.\par 2. Hypertension\par Well-controlled.\par 3. Hyperlipidemia \par Check lipid panel. \par Continue pravastatin 10 mg daily pending results.\par \par

## 2021-03-24 RX ORDER — BLOOD-GLUCOSE METER
KIT MISCELLANEOUS
Qty: 1 | Refills: 0 | Status: ACTIVE | COMMUNITY
Start: 2019-07-01

## 2021-03-24 RX ORDER — BLOOD-GLUCOSE TRANSMITTER
EACH MISCELLANEOUS
Qty: 1 | Refills: 3 | Status: ACTIVE | COMMUNITY
Start: 2021-03-23 | End: 1900-01-01

## 2021-03-24 RX ORDER — BLOOD-GLUCOSE SENSOR
EACH MISCELLANEOUS
Qty: 3 | Refills: 3 | Status: ACTIVE | COMMUNITY
Start: 2021-03-23 | End: 1900-01-01

## 2021-03-24 RX ORDER — BLOOD-GLUCOSE,RECEIVER,CONT
EACH MISCELLANEOUS
Qty: 1 | Refills: 0 | Status: ACTIVE | COMMUNITY
Start: 2021-03-23 | End: 1900-01-01

## 2021-03-29 ENCOUNTER — APPOINTMENT (OUTPATIENT)
Dept: ENDOCRINOLOGY | Facility: CLINIC | Age: 71
End: 2021-03-29
Payer: MEDICARE

## 2021-03-29 PROCEDURE — 99072 ADDL SUPL MATRL&STAF TM PHE: CPT

## 2021-03-29 PROCEDURE — G0108 DIAB MANAGE TRN  PER INDIV: CPT

## 2021-03-29 RX ORDER — INSULIN GLARGINE 100 [IU]/ML
100 INJECTION, SOLUTION SUBCUTANEOUS
Qty: 3 | Refills: 2 | Status: ACTIVE | COMMUNITY
Start: 2019-06-28 | End: 1900-01-01

## 2021-07-14 ENCOUNTER — TRANSCRIPTION ENCOUNTER (OUTPATIENT)
Age: 71
End: 2021-07-14

## 2021-07-15 ENCOUNTER — RESULT REVIEW (OUTPATIENT)
Age: 71
End: 2021-07-15

## 2021-07-15 ENCOUNTER — OUTPATIENT (OUTPATIENT)
Dept: OUTPATIENT SERVICES | Facility: HOSPITAL | Age: 71
LOS: 1 days | Discharge: HOME | End: 2021-07-15
Payer: MEDICARE

## 2021-07-15 VITALS
DIASTOLIC BLOOD PRESSURE: 73 MMHG | HEIGHT: 63 IN | RESPIRATION RATE: 17 BRPM | HEART RATE: 55 BPM | SYSTOLIC BLOOD PRESSURE: 133 MMHG | WEIGHT: 145.95 LBS | OXYGEN SATURATION: 98 % | TEMPERATURE: 98 F

## 2021-07-15 VITALS
RESPIRATION RATE: 16 BRPM | TEMPERATURE: 97 F | SYSTOLIC BLOOD PRESSURE: 137 MMHG | OXYGEN SATURATION: 98 % | HEART RATE: 59 BPM | DIASTOLIC BLOOD PRESSURE: 79 MMHG

## 2021-07-15 DIAGNOSIS — Z12.11 ENCOUNTER FOR SCREENING FOR MALIGNANT NEOPLASM OF COLON: ICD-10-CM

## 2021-07-15 DIAGNOSIS — Z87.19 PERSONAL HISTORY OF OTHER DISEASES OF THE DIGESTIVE SYSTEM: Chronic | ICD-10-CM

## 2021-07-15 LAB — GLUCOSE BLDC GLUCOMTR-MCNC: 117 MG/DL — HIGH (ref 70–99)

## 2021-07-15 PROCEDURE — 88305 TISSUE EXAM BY PATHOLOGIST: CPT | Mod: 26

## 2021-07-15 RX ORDER — SODIUM CHLORIDE 9 MG/ML
1000 INJECTION, SOLUTION INTRAVENOUS
Refills: 0 | Status: DISCONTINUED | OUTPATIENT
Start: 2021-07-15 | End: 2021-07-15

## 2021-07-15 RX ORDER — ONDANSETRON 8 MG/1
4 TABLET, FILM COATED ORAL ONCE
Refills: 0 | Status: DISCONTINUED | OUTPATIENT
Start: 2021-07-15 | End: 2021-07-15

## 2021-07-15 RX ORDER — SITAGLIPTIN AND METFORMIN HYDROCHLORIDE 500; 50 MG/1; MG/1
1 TABLET, FILM COATED ORAL
Qty: 0 | Refills: 0 | DISCHARGE

## 2021-07-15 RX ADMIN — SODIUM CHLORIDE 50 MILLILITER(S): 9 INJECTION, SOLUTION INTRAVENOUS at 11:19

## 2021-07-15 NOTE — ASU DISCHARGE PLAN (ADULT/PEDIATRIC) - OK TO LEAVE MESSAGE ON VOICEMAIL
Subjective:      Patient ID: Abner Chan is a 82 y.o. female. HPI  Chief Complaint   Patient presents with    1 Month Follow-Up    Other     patient currently wearing heart monitor          Here for recheck after hospitalization for GI bleed over a month ago. She had an EGD showing ulcerations. She is doing much better now and feeling back to baseline. However, she is having more constipation. Hypothyroidism: Patient presents for evaluation of thyroid function. Symptoms consist of fatigue, feeling cold and cold intolerance. Symptoms have present for several years. The symptoms are mild. The problem has been controlled. Previous thyroid studies include TSH, free thyroxine index and triiodothyronine total. The hypothyroidism is due to hypothyroidism. Lab Results   Component Value Date    TSH 5.530 (H) 08/16/2017    [id number] 54 (L) 02/11/2014       Review of Systems  Constitutional: Negative for fever, chills, diaphoresis, activity change, appetite change and fatigue. HENT: Negative for hearing loss, ear pain, congestion, sore throat, rhinorrhea, postnasal drip and ear discharge. Eyes: Negative for photophobia and visual disturbance. Respiratory: Negative for cough, chest tightness, shortness of breath and wheezing. Cardiovascular: Negative for chest pain and leg swelling. Gastrointestinal: Negative for nausea, vomiting, abdominal pain, diarrhea and constipation. Genitourinary: Negative for dysuria, urgency and frequency. Neurological: Negative for weakness, light-headedness and headaches. Psychiatric/Behavioral: Negative for sleep disturbance. Objective:   Physical Exam  Vitals:    02/15/18 1430   BP: 130/80   Pulse: 72   SpO2: (!) 18%     Wt Readings from Last 3 Encounters:   02/15/18 148 lb (67.1 kg)   02/07/18 147 lb (66.7 kg)   01/24/18 146 lb (66.2 kg)     Physical Exam   Constitutional: Vital signs are normal. She appears well-developed and well-nourished. She is active. HENT:   Head: Normocephalic and atraumatic. Right Ear: Tympanic membrane, external ear and ear canal normal. No drainage or tenderness. Left Ear: Tympanic membrane, external ear and ear canal normal. No drainage or tenderness. Nose: Nose normal. No mucosal edema or rhinorrhea. Mouth/Throat: Uvula is midline, oropharynx is clear and moist and mucous membranes are normal. Mucous membranes are not pale. Normal dentition. No posterior oropharyngeal edema or posterior oropharyngeal erythema. Eyes: Lids are normal. Right eye exhibits no chemosis and no discharge. Left eye exhibits no chemosis and no drainage. Right conjunctiva has no hemorrhage. Left conjunctiva has no hemorrhage. Right eye exhibits normal extraocular motion. Left eye exhibits normal extraocular motion. Right pupil is round and reactive. Left pupil is round and reactive. Pupils are equal.   Cardiovascular: Normal rate, regular rhythm, S1 normal, S2 normal and normal heart sounds. Exam reveals no gallop. No murmur heard. Pulmonary/Chest: Effort normal and breath sounds normal. No respiratory distress. She has no wheezes. She has no rhonchi. She has no rales. Abdominal: Soft. Normal appearance and bowel sounds are normal. She exhibits no distension and no mass. There is no hepatosplenomegaly. No tenderness. She has no rigidity, no rebound and no guarding. No hernia. Musculoskeletal:        Right lower leg: She exhibits no edema. Left lower leg: She exhibits no edema. Neurological: She is alert. Assessment:      Laly Malhotra was seen today for 1 month follow-up and other. Diagnoses and all orders for this visit:    Gastrointestinal hemorrhage with hematemesis  -     senna (SENOKOT) 8.6 MG tablet; Take 1 tablet by mouth 2 times daily    Other iron deficiency anemia  -     ferrous sulfate (FE TABS) 325 (65 Fe) MG EC tablet; Take 1 tablet by mouth 2 times daily  -     Iron; Future  -     CBC;  Future    Renal dysfunction  - Yes

## 2021-07-15 NOTE — ASU DISCHARGE PLAN (ADULT/PEDIATRIC) - ASU DC SPECIAL INSTRUCTIONSFT
Resume all previous medications    Please call Dr. Roberts's office at  to been seen in a follow up office visit two weeks after the completion of your procedure.

## 2021-07-16 LAB — SURGICAL PATHOLOGY STUDY: SIGNIFICANT CHANGE UP

## 2021-07-20 DIAGNOSIS — R10.9 UNSPECIFIED ABDOMINAL PAIN: ICD-10-CM

## 2021-07-20 DIAGNOSIS — D12.4 BENIGN NEOPLASM OF DESCENDING COLON: ICD-10-CM

## 2021-07-20 DIAGNOSIS — Z53.8 PROCEDURE AND TREATMENT NOT CARRIED OUT FOR OTHER REASONS: ICD-10-CM

## 2021-07-20 DIAGNOSIS — E11.9 TYPE 2 DIABETES MELLITUS WITHOUT COMPLICATIONS: ICD-10-CM

## 2021-07-20 DIAGNOSIS — Z79.84 LONG TERM (CURRENT) USE OF ORAL HYPOGLYCEMIC DRUGS: ICD-10-CM

## 2021-07-20 DIAGNOSIS — Z88.8 ALLERGY STATUS TO OTHER DRUGS, MEDICAMENTS AND BIOLOGICAL SUBSTANCES: ICD-10-CM

## 2021-07-20 DIAGNOSIS — K63.89 OTHER SPECIFIED DISEASES OF INTESTINE: ICD-10-CM

## 2021-07-20 DIAGNOSIS — K59.00 CONSTIPATION, UNSPECIFIED: ICD-10-CM

## 2021-08-25 RX ORDER — PEN NEEDLE, DIABETIC 29 G X1/2"
32G X 4 MM NEEDLE, DISPOSABLE MISCELLANEOUS
Qty: 4 | Refills: 3 | Status: ACTIVE | COMMUNITY
Start: 2019-06-28 | End: 1900-01-01

## 2021-11-04 ENCOUNTER — APPOINTMENT (OUTPATIENT)
Dept: ENDOCRINOLOGY | Facility: CLINIC | Age: 71
End: 2021-11-04
Payer: MEDICARE

## 2021-11-04 VITALS
TEMPERATURE: 98.4 F | WEIGHT: 144.6 LBS | HEART RATE: 78 BPM | SYSTOLIC BLOOD PRESSURE: 122 MMHG | OXYGEN SATURATION: 99 % | HEIGHT: 60.63 IN | BODY MASS INDEX: 27.66 KG/M2 | DIASTOLIC BLOOD PRESSURE: 66 MMHG

## 2021-11-04 PROCEDURE — 99214 OFFICE O/P EST MOD 30 MIN: CPT | Mod: 25

## 2021-11-04 PROCEDURE — 36415 COLL VENOUS BLD VENIPUNCTURE: CPT

## 2021-11-04 RX ORDER — INSULIN ASPART 100 [IU]/ML
100 INJECTION, SOLUTION INTRAVENOUS; SUBCUTANEOUS
Qty: 5 | Refills: 1 | Status: COMPLETED | COMMUNITY
Start: 2019-10-23 | End: 2021-11-04

## 2021-11-07 LAB
ALBUMIN SERPL ELPH-MCNC: 4.4 G/DL
ALP BLD-CCNC: 44 U/L
ALT SERPL-CCNC: 15 U/L
ANION GAP SERPL CALC-SCNC: 12 MMOL/L
AST SERPL-CCNC: 24 U/L
BASOPHILS # BLD AUTO: 0.02 K/UL
BASOPHILS NFR BLD AUTO: 0.4 %
BILIRUB SERPL-MCNC: 0.6 MG/DL
BUN SERPL-MCNC: 14 MG/DL
CALCIUM SERPL-MCNC: 9.8 MG/DL
CHLORIDE SERPL-SCNC: 103 MMOL/L
CHOLEST SERPL-MCNC: 188 MG/DL
CO2 SERPL-SCNC: 25 MMOL/L
CREAT SERPL-MCNC: 1.03 MG/DL
CREAT SPEC-SCNC: 90 MG/DL
EOSINOPHIL # BLD AUTO: 0.1 K/UL
EOSINOPHIL NFR BLD AUTO: 2.1 %
ESTIMATED AVERAGE GLUCOSE: 123 MG/DL
GLUCOSE SERPL-MCNC: 156 MG/DL
HBA1C MFR BLD HPLC: 5.9 %
HCT VFR BLD CALC: 42.8 %
HDLC SERPL-MCNC: 49 MG/DL
HGB BLD-MCNC: 13.8 G/DL
IMM GRANULOCYTES NFR BLD AUTO: 0.2 %
LDLC SERPL CALC-MCNC: 114 MG/DL
LYMPHOCYTES # BLD AUTO: 1.83 K/UL
LYMPHOCYTES NFR BLD AUTO: 38.5 %
MAN DIFF?: NORMAL
MCHC RBC-ENTMCNC: 29.2 PG
MCHC RBC-ENTMCNC: 32.2 GM/DL
MCV RBC AUTO: 90.5 FL
MICROALBUMIN 24H UR DL<=1MG/L-MCNC: <1.2 MG/DL
MICROALBUMIN/CREAT 24H UR-RTO: NORMAL MG/G
MONOCYTES # BLD AUTO: 0.51 K/UL
MONOCYTES NFR BLD AUTO: 10.7 %
NEUTROPHILS # BLD AUTO: 2.28 K/UL
NEUTROPHILS NFR BLD AUTO: 48.1 %
NONHDLC SERPL-MCNC: 138 MG/DL
PLATELET # BLD AUTO: 181 K/UL
POTASSIUM SERPL-SCNC: 4.4 MMOL/L
PROT SERPL-MCNC: 7.2 G/DL
RBC # BLD: 4.73 M/UL
RBC # FLD: 14 %
SODIUM SERPL-SCNC: 140 MMOL/L
TRIGL SERPL-MCNC: 120 MG/DL
VIT B12 SERPL-MCNC: 752 PG/ML
WBC # FLD AUTO: 4.75 K/UL

## 2021-11-07 NOTE — ASSESSMENT
[FreeTextEntry1] : This is a 71-year-old male with uncontrolled type 2 diabetes mellitus, hyperlipidemia,hypertension, here for follow up.\par 1. Type  diabetes mellitus\par Check hemoglobin A1c.\par Continue Janumet and Basaglar for now.  Will consider discontinuing Basaglar if hemoglobin A1c is controlled.\par He saw his ophthalmologist in December 2020 and denies retinopathy.\par Check urine microalbumin to evaluate for nephropathy. \par He denies neuropathy. \par He is on pravastatin 10 mg daily.\par Check vitamin B12 as he is on Metformin.\par He is not on an ACE inhibitor or ARB.\par 2. Hypertension\par Well-controlled.\par 3. Hyperlipidemia \par Check lipid panel. \par Continue pravastatin 10 mg daily pending results.\par \par

## 2021-11-07 NOTE — PHYSICAL EXAM
[Alert] : alert [Well Nourished] : well nourished [Healthy Appearance] : healthy appearance [No Acute Distress] : no acute distress [Well Developed] : well developed [Normal Voice/Communication] : normal voice communication [Normal Sclera/Conjunctiva] : normal sclera/conjunctiva [No Proptosis] : no proptosis [No Neck Mass] : no neck mass was observed [No LAD] : no lymphadenopathy [Supple] : the neck was supple [Thyroid Not Enlarged] : the thyroid was not enlarged [No Thyroid Nodules] : no palpable thyroid nodules [No Respiratory Distress] : no respiratory distress [No Accessory Muscle Use] : no accessory muscle use [Normal Rate and Effort] : normal respiratory rate and effort [Clear to Auscultation] : lungs were clear to auscultation bilaterally [Normal S1, S2] : normal S1 and S2 [Normal Rate] : heart rate was normal [Regular Rhythm] : with a regular rhythm [No Edema] : no peripheral edema [Pedal Pulses Normal] : the pedal pulses are present [Not Distended] : not distended [Spine Straight] : spine straight [No Stigmata of Cushings Syndrome] : no stigmata of Cushings Syndrome [Normal Gait] : normal gait [No Clubbing, Cyanosis] : no clubbing  or cyanosis of the fingernails [No Involuntary Movements] : no involuntary movements were seen [Acanthosis Nigricans] : no acanthosis nigricans [Right foot was examined, including] : right foot ~C was examined, including visual inspection with sensory and pulse exams [Left foot was examined, including] : left foot ~C was examined, including visual inspection with sensory and pulse exams [Normal] : normal [2+] : 2+ in the dorsalis pedis [Diminished Throughout Both Feet] : normal tactile sensation with monofilament testing throughout both feet [No Tremors] : no tremors [Normal Sensation on Monofilament Testing] : normal sensation on monofilament testing of lower extremities [Normal Affect] : the affect was normal [Normal Insight/Judgement] : insight and judgment were intact [Normal Mood] : the mood was normal [de-identified] : Onychomycosis

## 2021-11-07 NOTE — HISTORY OF PRESENT ILLNESS
[FreeTextEntry1] : CC: Diabetes \par This is a 71 year old male with insulin requiring type 2 diabetes mellitus, HTN, HLD, here for follow up. \par T2DM was diagnosed >10 years ago.  He was on Metformin and NovoLog in the past.  He is now on Janumet 50/1000 mg 2 times a day and Basaglar 6 units daily.  He stopped taking Janumet 1 week ago because he ran out.\par He self monitors blood glucose 2 times a day.  Fasting 110s, bedtime 130s.  He denies hypoglycemia.\par He saw his ophthalmologist in December 2020 and denies retinopathy.\par He denies neuropathy.  He denies nephropathy.\par He is on pravastatin 10 mg daily.\par He is not on an ACE inhibitor or ARB

## 2021-12-02 RX ORDER — SITAGLIPTIN AND METFORMIN HYDROCHLORIDE 50; 1000 MG/1; MG/1
50-1000 TABLET, FILM COATED ORAL TWICE DAILY
Qty: 3 | Refills: 2 | Status: COMPLETED | COMMUNITY
Start: 2021-03-29 | End: 2022-08-28

## 2022-02-03 ENCOUNTER — APPOINTMENT (OUTPATIENT)
Dept: ENDOCRINOLOGY | Facility: CLINIC | Age: 72
End: 2022-02-03
Payer: MEDICARE

## 2022-02-03 VITALS
HEART RATE: 75 BPM | TEMPERATURE: 98 F | DIASTOLIC BLOOD PRESSURE: 76 MMHG | WEIGHT: 143.61 LBS | HEIGHT: 62.6 IN | SYSTOLIC BLOOD PRESSURE: 119 MMHG | BODY MASS INDEX: 25.77 KG/M2 | OXYGEN SATURATION: 98 %

## 2022-02-03 DIAGNOSIS — I10 ESSENTIAL (PRIMARY) HYPERTENSION: ICD-10-CM

## 2022-02-03 DIAGNOSIS — E78.5 HYPERLIPIDEMIA, UNSPECIFIED: ICD-10-CM

## 2022-02-03 DIAGNOSIS — E11.65 TYPE 2 DIABETES MELLITUS WITH HYPERGLYCEMIA: ICD-10-CM

## 2022-02-03 LAB — GLUCOSE BLDC GLUCOMTR-MCNC: 200

## 2022-02-03 PROCEDURE — 95250 CONT GLUC MNTR PHYS/QHP EQP: CPT

## 2022-02-03 PROCEDURE — 95251 CONT GLUC MNTR ANALYSIS I&R: CPT

## 2022-02-03 PROCEDURE — 36415 COLL VENOUS BLD VENIPUNCTURE: CPT

## 2022-02-03 PROCEDURE — 99214 OFFICE O/P EST MOD 30 MIN: CPT | Mod: 25

## 2022-02-07 ENCOUNTER — NON-APPOINTMENT (OUTPATIENT)
Age: 72
End: 2022-02-07

## 2022-02-07 PROBLEM — I10 HYPERTENSION: Status: ACTIVE | Noted: 2019-06-27

## 2022-02-07 PROBLEM — E78.5 HYPERLIPIDEMIA: Status: ACTIVE | Noted: 2019-06-27

## 2022-02-07 PROBLEM — E11.65 DIABETES MELLITUS TYPE 2, UNCONTROLLED: Status: ACTIVE | Noted: 2019-06-27

## 2022-02-07 LAB
ALBUMIN SERPL ELPH-MCNC: 4.7 G/DL
ALP BLD-CCNC: 57 U/L
ALT SERPL-CCNC: 13 U/L
ANION GAP SERPL CALC-SCNC: 13 MMOL/L
AST SERPL-CCNC: 21 U/L
BASOPHILS # BLD AUTO: 0.02 K/UL
BASOPHILS NFR BLD AUTO: 0.4 %
BILIRUB SERPL-MCNC: 0.6 MG/DL
BUN SERPL-MCNC: 12 MG/DL
CALCIUM SERPL-MCNC: 9.9 MG/DL
CHLORIDE SERPL-SCNC: 101 MMOL/L
CHOLEST SERPL-MCNC: 144 MG/DL
CO2 SERPL-SCNC: 25 MMOL/L
CREAT SERPL-MCNC: 1.03 MG/DL
CREAT SPEC-SCNC: 92 MG/DL
EOSINOPHIL # BLD AUTO: 0.08 K/UL
EOSINOPHIL NFR BLD AUTO: 1.8 %
ESTIMATED AVERAGE GLUCOSE: 183 MG/DL
GLUCOSE SERPL-MCNC: 217 MG/DL
HBA1C MFR BLD HPLC: 8 %
HCT VFR BLD CALC: 43.1 %
HDLC SERPL-MCNC: 62 MG/DL
HGB BLD-MCNC: 13.8 G/DL
IMM GRANULOCYTES NFR BLD AUTO: 0.2 %
LDLC SERPL CALC-MCNC: 67 MG/DL
LYMPHOCYTES # BLD AUTO: 1.48 K/UL
LYMPHOCYTES NFR BLD AUTO: 32.4 %
MAN DIFF?: NORMAL
MCHC RBC-ENTMCNC: 28.8 PG
MCHC RBC-ENTMCNC: 32 GM/DL
MCV RBC AUTO: 90 FL
MICROALBUMIN 24H UR DL<=1MG/L-MCNC: 1.3 MG/DL
MICROALBUMIN/CREAT 24H UR-RTO: 14 MG/G
MONOCYTES # BLD AUTO: 0.45 K/UL
MONOCYTES NFR BLD AUTO: 9.8 %
NEUTROPHILS # BLD AUTO: 2.53 K/UL
NEUTROPHILS NFR BLD AUTO: 55.4 %
NONHDLC SERPL-MCNC: 82 MG/DL
PLATELET # BLD AUTO: 178 K/UL
POTASSIUM SERPL-SCNC: 4.1 MMOL/L
PROT SERPL-MCNC: 7 G/DL
RBC # BLD: 4.79 M/UL
RBC # FLD: 13.3 %
SODIUM SERPL-SCNC: 139 MMOL/L
TRIGL SERPL-MCNC: 77 MG/DL
VIT B12 SERPL-MCNC: 918 PG/ML
WBC # FLD AUTO: 4.57 K/UL

## 2022-02-07 NOTE — ASSESSMENT
[FreeTextEntry1] : This is a 72-year-old male with uncontrolled type 2 diabetes mellitus, hyperlipidemia,hypertension, here for follow up.\par 1. Type  2 diabetes mellitus\par Check hemoglobin A1c.\par Continue Janumet for now.\par He saw his ophthalmologist in December 2020 and denies retinopathy.  Follow-up advised.\par Check urine microalbumin to evaluate for nephropathy. \par He denies neuropathy. \par He is not compliant with pravastatin.\par Check vitamin B12 as he is on Metformin.\par He is not on an ACE inhibitor or ARB.\par Freestyle kobe pro CGM placed today.\par 2. Hypertension\par Well-controlled.\par 3. Hyperlipidemia \par Check lipid panel. \par We will resume statin pending results.\par \par

## 2022-02-07 NOTE — PHYSICAL EXAM
[Alert] : alert [Well Nourished] : well nourished [Healthy Appearance] : healthy appearance [No Acute Distress] : no acute distress [Well Developed] : well developed [Normal Voice/Communication] : normal voice communication [Normal Sclera/Conjunctiva] : normal sclera/conjunctiva [No Proptosis] : no proptosis [No Neck Mass] : no neck mass was observed [No LAD] : no lymphadenopathy [Supple] : the neck was supple [Thyroid Not Enlarged] : the thyroid was not enlarged [No Thyroid Nodules] : no palpable thyroid nodules [No Respiratory Distress] : no respiratory distress [No Accessory Muscle Use] : no accessory muscle use [Normal Rate and Effort] : normal respiratory rate and effort [Clear to Auscultation] : lungs were clear to auscultation bilaterally [Normal S1, S2] : normal S1 and S2 [Normal Rate] : heart rate was normal [Regular Rhythm] : with a regular rhythm [No Edema] : no peripheral edema [Pedal Pulses Normal] : the pedal pulses are present [Not Distended] : not distended [Spine Straight] : spine straight [No Stigmata of Cushings Syndrome] : no stigmata of Cushings Syndrome [Normal Gait] : normal gait [No Clubbing, Cyanosis] : no clubbing  or cyanosis of the fingernails [No Involuntary Movements] : no involuntary movements were seen [Acanthosis Nigricans] : no acanthosis nigricans [Right foot was examined, including] : right foot ~C was examined, including visual inspection with sensory and pulse exams [Left foot was examined, including] : left foot ~C was examined, including visual inspection with sensory and pulse exams [Normal] : normal [2+] : 2+ in the dorsalis pedis [Diminished Throughout Both Feet] : normal tactile sensation with monofilament testing throughout both feet [No Tremors] : no tremors [Normal Sensation on Monofilament Testing] : normal sensation on monofilament testing of lower extremities [Normal Affect] : the affect was normal [Normal Insight/Judgement] : insight and judgment were intact [Normal Mood] : the mood was normal [de-identified] : Onychomycosis, dry skin

## 2022-02-07 NOTE — HISTORY OF PRESENT ILLNESS
[FreeTextEntry1] : CC: Diabetes \par This is a 72 year old male with insulin requiring type 2 diabetes mellitus, HTN, HLD, here for follow up. \par T2DM was diagnosed >10 years ago.  He was on Metformin, Basaglar, and NovoLog in the past.  He is now on Janumet 50/1000 mg 2 times a day.\par He self monitors blood glucose 2 times a day.  Fasting 170-190s, bedtime 80-200s.  He denies hypoglycemia.\par He saw his ophthalmologist in December 2020 and denies retinopathy.\par He denies neuropathy.  He denies nephropathy.\par He is supposed to take pravastatin but stopped taking it.\par He is not on an ACE inhibitor or ARB

## 2022-02-08 RX ORDER — GLIMEPIRIDE 1 MG/1
1 TABLET ORAL
Qty: 90 | Refills: 2 | Status: COMPLETED | COMMUNITY
Start: 2022-02-08 | End: 2022-11-05

## 2022-02-22 ENCOUNTER — APPOINTMENT (OUTPATIENT)
Dept: ENDOCRINOLOGY | Facility: CLINIC | Age: 72
End: 2022-02-22
Payer: MEDICARE

## 2022-02-22 ENCOUNTER — NON-APPOINTMENT (OUTPATIENT)
Age: 72
End: 2022-02-22

## 2022-02-22 PROCEDURE — ZZZZZ: CPT

## 2022-03-14 RX ORDER — BLOOD SUGAR DIAGNOSTIC
STRIP MISCELLANEOUS
Qty: 4 | Refills: 3 | Status: COMPLETED | COMMUNITY
Start: 2022-03-14 | End: 1900-01-01

## 2022-05-19 ENCOUNTER — APPOINTMENT (OUTPATIENT)
Dept: OPHTHALMOLOGY | Facility: CLINIC | Age: 72
End: 2022-05-19
Payer: MEDICARE

## 2022-05-19 ENCOUNTER — NON-APPOINTMENT (OUTPATIENT)
Age: 72
End: 2022-05-19

## 2022-05-19 PROCEDURE — 92134 CPTRZ OPH DX IMG PST SGM RTA: CPT

## 2022-05-19 PROCEDURE — 92014 COMPRE OPH EXAM EST PT 1/>: CPT

## 2022-07-28 ENCOUNTER — APPOINTMENT (OUTPATIENT)
Dept: GASTROENTEROLOGY | Facility: CLINIC | Age: 72
End: 2022-07-28

## 2022-07-28 VITALS
WEIGHT: 130 LBS | SYSTOLIC BLOOD PRESSURE: 110 MMHG | DIASTOLIC BLOOD PRESSURE: 70 MMHG | BODY MASS INDEX: 22.2 KG/M2 | HEIGHT: 64 IN

## 2022-07-28 DIAGNOSIS — K59.09 OTHER CONSTIPATION: ICD-10-CM

## 2022-07-28 PROCEDURE — 99204 OFFICE O/P NEW MOD 45 MIN: CPT

## 2022-07-28 RX ORDER — EMPAGLIFLOZIN 10 MG/1
10 TABLET, FILM COATED ORAL
Refills: 0 | Status: ACTIVE | COMMUNITY

## 2022-07-28 RX ORDER — POLYETHYLENE GLYCOL 3350 AND ELECTROLYTES WITH LEMON FLAVOR 236; 22.74; 6.74; 5.86; 2.97 G/4L; G/4L; G/4L; G/4L; G/4L
236 POWDER, FOR SOLUTION ORAL
Qty: 1 | Refills: 0 | Status: ACTIVE | COMMUNITY
Start: 2022-07-28 | End: 1900-01-01

## 2022-07-28 RX ORDER — SITAGLIPTIN AND METFORMIN HYDROCHLORIDE 50; 1000 MG/1; MG/1
50-1000 TABLET, FILM COATED ORAL
Refills: 0 | Status: ACTIVE | COMMUNITY

## 2022-07-28 NOTE — HISTORY OF PRESENT ILLNESS
[FreeTextEntry1] : He is a 72-year-old male referred for a screening colonoscopy.  He has never had a colonoscopy before.  He admits to chronic constipation going on for  approximately 1 year.  He denies a family history of colon cancer.  He also admits to  solid food intermittently getting stuck in his esophagus  for a year.  He denies abdominal pain, weight loss or NSAID use

## 2022-07-28 NOTE — ASSESSMENT
[FreeTextEntry1] : ZHAO YORK was advised to undergo colonoscopy to which he agreed. The procedure will be performed in [facility] with the assistance of an anesthesiologist. The patient was given a Golytely  preparation prescription and understood the \par procedure as it was explained to his. He was given a booklet distributed by the [employer]\par  [employer] explaining the procedure in detail and he understood the risks of the procedure not limited to infection, bleeding,\par perforation or non- diagnosis of colorectal cancer. He was advised that he could not drive home, if he chooses to\par  receive sedation.\par \par Further diagnostic and treatment recommendations will be based upon the procedure and any biopsies, if they are taken.\par \par Thank you for allowing me to participate in this patients health care.\par \par , Best personal regards -- Don\par \par ZHAO YORK was advised to undergo endoscopy to which he agreed. The procedure will be performed in [facility] with the assistance of an anesthesiologist. He was given a booklet distributed by the [employer] explaining the procedure in detail and he understood the risks of the procedure not limited to infection, bleeding, perforation or non- diagnosis of gastric or esophageal cancer.  He was advised that he could not drive home, if he chooses to receive sedation. Further diagnostic and treatment recommendations will be based upon the procedure and any biopsies, if they are taken. Thank you for allowing me to participate in this patients health care.\par \par

## 2022-07-28 NOTE — REVIEW OF SYSTEMS
[Constipation] : constipation [Negative] : Heme/Lymph [FreeTextEntry7] : Food gets stuck in esophagus

## 2022-07-28 NOTE — PHYSICAL EXAM
[Auscultation Breath Sounds / Voice Sounds] : lungs were clear to auscultation bilaterally [Heart Rate And Rhythm] : heart rate was normal and rhythm regular [Heart Sounds] : normal S1 and S2 [Heart Sounds Gallop] : no gallops [Murmurs] : no murmurs [Heart Sounds Pericardial Friction Rub] : no pericardial rub [Bowel Sounds] : normal bowel sounds [Abdomen Soft] : soft [Abdomen Tenderness] : non-tender [] : no hepato-splenomegaly [Abdomen Mass (___ Cm)] : no abdominal mass palpated [FreeTextEntry1] : Umbilical hernia

## 2022-08-04 RX ORDER — PRAVASTATIN SODIUM 10 MG/1
10 TABLET ORAL
Qty: 90 | Refills: 0 | Status: COMPLETED | COMMUNITY
Start: 2020-12-11 | End: 2022-11-02

## 2022-09-15 ENCOUNTER — RESULT REVIEW (OUTPATIENT)
Age: 72
End: 2022-09-15

## 2022-09-15 ENCOUNTER — APPOINTMENT (OUTPATIENT)
Dept: GASTROENTEROLOGY | Facility: AMBULATORY SURGERY CENTER | Age: 72
End: 2022-09-15

## 2022-09-15 PROCEDURE — G0121 COLON CA SCRN NOT HI RSK IND: CPT

## 2022-09-15 PROCEDURE — 43239 EGD BIOPSY SINGLE/MULTIPLE: CPT

## 2022-10-01 ENCOUNTER — NON-APPOINTMENT (OUTPATIENT)
Age: 72
End: 2022-10-01

## 2022-10-01 RX ORDER — CLARITHROMYCIN 500 MG/1
500 TABLET, FILM COATED ORAL
Qty: 28 | Refills: 0 | Status: ACTIVE | COMMUNITY
Start: 2022-10-01 | End: 1900-01-01

## 2022-10-01 RX ORDER — PANTOPRAZOLE 40 MG/1
40 TABLET, DELAYED RELEASE ORAL
Qty: 28 | Refills: 0 | Status: ACTIVE | COMMUNITY
Start: 2022-10-01 | End: 1900-01-01

## 2022-10-01 RX ORDER — AMOXICILLIN 500 MG/1
500 CAPSULE ORAL TWICE DAILY
Qty: 56 | Refills: 0 | Status: ACTIVE | COMMUNITY
Start: 2022-10-01 | End: 1900-01-01

## 2022-10-14 ENCOUNTER — APPOINTMENT (OUTPATIENT)
Dept: GASTROENTEROLOGY | Facility: CLINIC | Age: 72
End: 2022-10-14

## 2022-10-14 VITALS
WEIGHT: 143 LBS | RESPIRATION RATE: 12 BRPM | TEMPERATURE: 98 F | SYSTOLIC BLOOD PRESSURE: 110 MMHG | HEIGHT: 64 IN | BODY MASS INDEX: 24.41 KG/M2 | HEART RATE: 80 BPM | OXYGEN SATURATION: 99 % | DIASTOLIC BLOOD PRESSURE: 70 MMHG

## 2022-10-14 VITALS
SYSTOLIC BLOOD PRESSURE: 110 MMHG | HEIGHT: 64 IN | DIASTOLIC BLOOD PRESSURE: 70 MMHG | WEIGHT: 143 LBS | BODY MASS INDEX: 24.41 KG/M2

## 2022-10-14 DIAGNOSIS — A04.8 OTHER SPECIFIED BACTERIAL INTESTINAL INFECTIONS: ICD-10-CM

## 2022-10-14 DIAGNOSIS — Z12.11 ENCOUNTER FOR SCREENING FOR MALIGNANT NEOPLASM OF COLON: ICD-10-CM

## 2022-10-14 DIAGNOSIS — R13.19 OTHER DYSPHAGIA: ICD-10-CM

## 2022-10-14 PROCEDURE — 99213 OFFICE O/P EST LOW 20 MIN: CPT | Mod: 25

## 2022-10-14 PROCEDURE — 83014 H PYLORI DRUG ADMIN: CPT

## 2022-10-14 NOTE — HISTORY OF PRESENT ILLNESS
[FreeTextEntry1] : Colonoscopy  revealed normal mucosa. Endoscopy revealed  normal mucosa and a small hiatal hernia. Routine biopsies were positive for Helicobacter pylori. He was treated with antibiotics but couldn't finish the course as they were making him sick. . He is feeling well and has no complaints. He is able to swallow his food now without  difficulty.

## 2022-10-14 NOTE — ASSESSMENT
[FreeTextEntry1] : Breath Test was administered  to confirm eradication of his bacteria.\par \par Office f/u in 1 month

## 2022-10-15 LAB — UREA BREATH TEST QL: NEGATIVE

## 2023-08-22 ENCOUNTER — APPOINTMENT (OUTPATIENT)
Dept: PODIATRY | Facility: CLINIC | Age: 73
End: 2023-08-22

## 2023-11-02 NOTE — QUALITY MEASURES
No new episodes since 08/22.  No need for prophylactic antibiotics  
Out patient follow up for weight loss regime  
PCP follow up  
[Visual inspection, sensory exam] : Foot exam, including visual inspection, sensory exam with mono filament, and pulse exam, was performed within the last 12 months

## 2024-07-08 ENCOUNTER — APPOINTMENT (OUTPATIENT)
Dept: PODIATRY | Facility: CLINIC | Age: 74
End: 2024-07-08
Payer: MEDICARE

## 2024-07-08 DIAGNOSIS — B35.3 TINEA PEDIS: ICD-10-CM

## 2024-07-08 DIAGNOSIS — B35.1 TINEA UNGUIUM: ICD-10-CM

## 2024-07-08 DIAGNOSIS — L85.1 ACQUIRED KERATOSIS [KERATODERMA] PALMARIS ET PLANTARIS: ICD-10-CM

## 2024-07-08 DIAGNOSIS — E11.42 TYPE 2 DIABETES MELLITUS WITH DIABETIC POLYNEUROPATHY: ICD-10-CM

## 2024-07-08 PROCEDURE — 11721 DEBRIDE NAIL 6 OR MORE: CPT | Mod: 59

## 2024-07-08 PROCEDURE — 11056 PARNG/CUTG B9 HYPRKR LES 2-4: CPT

## 2024-07-08 PROCEDURE — 99203 OFFICE O/P NEW LOW 30 MIN: CPT | Mod: 25

## 2024-07-08 RX ORDER — KETOCONAZOLE 20 MG/G
2 CREAM TOPICAL DAILY
Qty: 1 | Refills: 3 | Status: ACTIVE | COMMUNITY
Start: 2024-07-08 | End: 1900-01-01

## 2024-07-12 PROBLEM — L85.1 PLANTAR KERATOSIS, ACQUIRED: Status: ACTIVE | Noted: 2024-07-10

## 2024-07-12 PROBLEM — E11.42 TYPE 2 DIABETES MELLITUS WITH DIABETIC POLYNEUROPATHY: Status: ACTIVE | Noted: 2024-07-10

## 2024-07-12 PROBLEM — B35.3 TINEA PEDIS: Status: ACTIVE | Noted: 2024-07-10

## 2024-07-16 NOTE — ED PROVIDER NOTE - NS ED ATTENDING STATEMENT MOD
(2) more than 100 beats/min I have personally seen and examined this patient.  I have fully participated in the care of this patient. I have reviewed all pertinent clinical information, including history, physical exam, plan and the Resident’s note and agree except as noted.

## 2024-07-25 ENCOUNTER — NON-APPOINTMENT (OUTPATIENT)
Age: 74
End: 2024-07-25

## 2024-07-25 ENCOUNTER — APPOINTMENT (OUTPATIENT)
Dept: OPHTHALMOLOGY | Facility: CLINIC | Age: 74
End: 2024-07-25
Payer: MEDICARE

## 2024-07-25 PROCEDURE — 92014 COMPRE OPH EXAM EST PT 1/>: CPT

## 2024-07-25 PROCEDURE — 92134 CPTRZ OPH DX IMG PST SGM RTA: CPT

## 2024-10-02 ENCOUNTER — APPOINTMENT (OUTPATIENT)
Dept: PODIATRY | Facility: CLINIC | Age: 74
End: 2024-10-02

## 2024-11-08 ENCOUNTER — APPOINTMENT (OUTPATIENT)
Dept: PODIATRY | Facility: CLINIC | Age: 74
End: 2024-11-08

## 2024-11-08 DIAGNOSIS — E11.42 TYPE 2 DIABETES MELLITUS WITH DIABETIC POLYNEUROPATHY: ICD-10-CM

## 2024-11-08 DIAGNOSIS — B35.1 TINEA UNGUIUM: ICD-10-CM

## 2024-11-08 DIAGNOSIS — L84 CORNS AND CALLOSITIES: ICD-10-CM

## 2024-11-08 PROCEDURE — 11056 PARNG/CUTG B9 HYPRKR LES 2-4: CPT

## 2024-11-08 PROCEDURE — 11721 DEBRIDE NAIL 6 OR MORE: CPT | Mod: 59

## 2024-11-08 PROCEDURE — 99213 OFFICE O/P EST LOW 20 MIN: CPT | Mod: 25

## 2024-11-13 PROBLEM — L84 CALLUS OF FOOT: Status: ACTIVE | Noted: 2024-11-12

## 2025-02-07 ENCOUNTER — APPOINTMENT (OUTPATIENT)
Dept: PODIATRY | Facility: CLINIC | Age: 75
End: 2025-02-07